# Patient Record
(demographics unavailable — no encounter records)

---

## 2019-03-21 NOTE — RADIOLOGY REPORT (SQ)
EXAM DESCRIPTION:  FOOT RIGHT COMPLETE



COMPLETED DATE/TIME:  3/21/2019 8:33 am



REASON FOR STUDY:  NON-PRS CHRONIC ULCER OTH PRT RIGHT FOOT W FAT LAYER EXPOSED L97.512  NON-PRS 
ARABELLA ULCER OTH PRT RIGHT FOOT W FAT LAYER



COMPARISON:  1/8/2019



NUMBER OF VIEWS:  Three views.



TECHNIQUE:  AP, lateral and oblique  radiographic images acquired of the right foot.



LIMITATIONS:  None.



FINDINGS:  MINERALIZATION: Normal.

BONES: Postsurgical changes from prior great toe phalanx amputation.  Unchanged heterotopic fragments
 about the distal 1st metatarsal.  There is increased heterotopic ossification and cortical lucency a
bout the mid and distal 3rd phalanges suggestive of osteomyelitis.  No evidence of acute fracture or 
dislocation.  Small plantar calcaneal enthesophytes.  Trigonal process.

JOINTS: Mild midfoot degenerative changes.  No large effusion.

SOFT TISSUES: Postsurgical changes from 1st digit amputation.  Soft tissue swelling about the distal 
3rd digit.  .  No radiopaque foreign body.  Scattered vascular calcifications.

OTHER: No other significant finding.



IMPRESSION:  Increased cortical destruction about the mid and distal 3rd phalanges most compatible wi
th osteomyelitis.

Postsurgical changes from prior great toe phalanx amputation, stable.



TECHNICAL DOCUMENTATION:  JOB ID:  [id number]

 2011 [employer]- All Rights Reserved



Reading location - IP/workstation name: [email or web address]

## 2019-04-17 NOTE — RADIOLOGY REPORT (SQ)
EXAM DESCRIPTION:  FOOT RIGHT COMPLETE



COMPLETED DATE/TIME:  4/17/2019 11:56 am



REASON FOR STUDY:  NON-PRS CHRONIC ULCER OTH PRT RIGHT FOOT W NECROSIS OF BONE L97.514  NON-PRS CHRON
IC ULCER OTH PRT RIGHT FOOT W NECROSIS



COMPARISON:  8/20 1/9



NUMBER OF VIEWS:  Three views.



TECHNIQUE:  AP, lateral and oblique  radiographic images acquired of the right foot.



LIMITATIONS:  None.



FINDINGS:  BONES: There is worsening bony destruction about the mid and distal 3rd phalanges.  Eviden
ce of prior 1st toe amputation, not significantly changed from prior.  Unchanged appearance of the di
stal 2nd tuft. No new fracture.  No evidence of dislocation.  Trigonal process.  Calcaneal enthesophy
laila.  Midfoot degenerative change with osteophytosis.

JOINTS: No effusions.

SOFT TISSUES: With soft tissue swelling about the distal 3rd digit.  Prior 1st digit amputation.

OTHER: No other significant finding.



IMPRESSION:  Worsening bony destruction about the mid and distal 3rd phalanges most compatible with o
steomyelitis.

Prior 1st toe amputation, unchanged.



TECHNICAL DOCUMENTATION:  JOB ID:  [id number]

 2011 [employer]- All Rights Reserved



Reading location - IP/workstation name: [email or web address]

## 2019-06-13 NOTE — RADIOLOGY REPORT (SQ)
EXAM DESCRIPTION:  FOOT LEFT COMPLETE



COMPLETED DATE/TIME:  6/13/2019 11:28 am



REASON FOR STUDY:  NON-PRS CHRONIC ULCER OTH PRT LEFT FOOT W FAT LAYER EXPOSED L97.522  NON-PRS CHRON
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



COMPARISON:  None.



NUMBER OF VIEWS:  Three views.



TECHNIQUE:  AP, lateral and oblique  radiographic images acquired of the left foot.



LIMITATIONS:  None.



FINDINGS:  Aggressive deemed mineralization of the distal phalanx left foot 3rd toe distal tuft, dist
al phalanx.  Overlying soft tissue swelling with radiopaque ointment in knee soft tissue ulcer.

Old prior great toe amputation at the piece of the 1st metatarsal.

Remainder of the left foot is otherwise unremarkable.



IMPRESSION:  Osteomyelitis, left foot 3rd toe distal tuft, distal phalanx.



TECHNICAL DOCUMENTATION:  JOB ID:  [id number]

 2011 [employer]- All Rights Reserved



Reading location - IP/workstation name: [email or web address]

## 2019-07-09 NOTE — RADIOLOGY REPORT (SQ)
EXAM DESCRIPTION:  FOOT LEFT COMPLETE



COMPLETED DATE/TIME:  7/9/2019 10:51 am



REASON FOR STUDY:  ULCER ON LT FOOT L97.522  NON-PRS CHRONIC ULCER OTH PRT LEFT FOOT W FAT LAYER



COMPARISON:  6/13/2019



NUMBER OF VIEWS:  Three views.



TECHNIQUE:  AP, lateral and oblique  radiographic images acquired of the left foot.



LIMITATIONS:  None.



FINDINGS:  MINERALIZATION: Normal.

BONES: Progression of the osteomyelitis of the distal 3rd digit.  progressive bone destruction.

Chronic postsurgical changes of the 1st ray.  Similar in appearance to previous.

Focal lytic lesion proximal seconds metatarsal stable.

JOINTS: No effusions.

SOFT TISSUES: Chronic calcification of the Achilles.

OTHER: No other significant finding.



IMPRESSION:  Progressive osteomyelitis of the 3rd digit.

Stable appearance of the changes in the 1st and seconds rays.



TECHNICAL DOCUMENTATION:  JOB ID:  [id number]

 2011 [employer]- All Rights Reserved



Reading location - IP/workstation name: [email or web address]

## 2019-09-24 NOTE — OPERATIVE REPORT
Operative Report-[facility]


Operative Report: 





DATE OF SURGERY: 9/24/2019


PREOPERATIVE DIAGNOSIS: CATARACT, LEFT EYE.


POSTOPERATIVE DIAGNOSIS: CATARACT, LEFT EYE.


PROCEDURE PERFORMED: PHACOEMULSIFICATION WITH POSTERIOR CHAMBER INTRAOCULAR 

LENS, LEFT EYE.


Intraocular Lens Model : MX60E 13.5


Total Phaco Time: 2.04 minutes


SURGEON: TANNER GOLDMAN MD


ANESTHESIA: TOPICAL WITH MAC.


INDICATIONS FOR SURGERY: Difficultly driving at night


PROCEDURE:


The patient was brought to the Operating Room and placed on the operative table.

Following tetracaine drops, topical anesthesia was administered. This consisted 

of instrument wipe pledgets soaked in a solution of 4% Xylocaine mixed with 

0.75% Marcaine in a 1:2 ratio. A 2 x 1 cm pledget was placed in the superior 

fornix. A 1 x 1 cm pledget was placed in the inferior fornix. The eye was 

patched shut for 5 minutes. The patch was removed. The eye was sterilely prepped

and draped in the usual manner. Lid speculum was placed in the eye. The pledgets

were removed. 4-0 black silk sutures were placed around the superior and the 

inferior rectus muscles to be used as traction. A conjunctival peritomy was made

at the 10 o'clock position. Hemostasis was obtained with bipolar cautery. A 

posterior limbal groove was created using a crescent knife and dissected 

anteriorly towards the cornea. A sharp point blade was used to create a 

paracentesis site at the 2 o'clock position. 0.2 cc non preserved Lidocaine was 

injected into the anterior chamber. A 2.4 mm keratome was used to enter the 

anterior chamber through the groove. Viscoelastic was injected into the anterior

chamber. An anterior capsulotomy was performed using Utrata forceps in a 

capsulorrhexis fashion. Hydrodissection and hydrodelineation were performed. 

Phacoemulsification was performed in divide-and-conquer technique.


Following this, the I/A unit was used to remove residual cortex. Viscoelastic 

was injected into the capsular bag. The Intraocular lens was placed in the 

capsular bag. The I/A unit was used to remove residual viscoelastic. The wound 

was seen to be watertight under high and low pressure, and no sutures were 

placed. The intraocular lens was well centered. The pressure was adjusted in the

eye to normal pressure. The 4-0 black silk sutures and lid speculum were 

removed. The eye was shielded after Besivance and Cosopt drops were placed. The 

patient tolerated the procedure well and was sent to the Recovery Room in good 

condition.

## 2019-11-19 NOTE — OPERATIVE REPORT
Operative Report-[facility]


Operative Report: 





DATE OF SURGERY: 11/19/2019


PREOPERATIVE DIAGNOSIS: CATARACT, RIGHT EYE.


POSTOPERATIVE DIAGNOSIS: CATARACT, RIGHT EYE.


PROCEDURE PERFORMED:


PHACOEMULSIFICATION WITH POSTERIOR CHAMBER INTRAOCULAR LENS, RIGHT EYE.


Intraocular Lens Model : MX60E 15.0


Total Phaco Time: 5.51 CDE


SURGEON: TANNER GOLDMAN MD


ANESTHESIA: TOPICAL WITH MAC.


INDICATIONS FOR SURGERY: Difficulty reading small print.


PROCEDURE:


The patient was brought to the Operating Room and placed on the operative table.

Following tetracaine drops, topical anesthesia was administered. This consisted 

of instrument wipe pledgets soaked in a solution of 4% Xylocaine mixed with 

0.75% Marcaine in a 1:2 ratio. A 2 x 1 cm pledget was placed in the superior 

fornix. A 1 x 1 cm pledget was placed in the inferior fornix. The eye was 

patched shut for 5 minutes. The patch was removed. The eye was sterilely prepped

and draped in the usual manner. Lid speculum was placed in the eye. The pledgets

were removed. 4-0 black silk sutures were placed around the superior and the 

inferior rectus muscles to be used as traction. A conjunctival peritomy was made

at the 10 o'clock position. Hemostasis was obtained with bipolar cautery. A 

posterior limbal groove was created using a crescent knife and dissected 

anteriorly towards the cornea. A sharp point blade was used to create a 

paracentesis site at the 2 o'clock position. 0.2 cc non preserved Lidocaine was 

injected into the anterior chamber. A 2.4 mm keratome was used to enter the 

anterior chamber through the groove. Viscoelastic was injected into the anterior

chamber. An anterior capsulotomy was performed using Utrata forceps in a 

capsulorrhexis fashion. Hydrodissection and hydrodelineation were performed. 

Phacoemulsification was performed in divide-and-conquer technique.


Following this, the I/A unit was used to remove residual cortex. Viscoelastic 

was injected into the capsular bag. The Intraocular lens was placed in the 

capsular bag. The I/A unit was used to remove residual viscoelastic. The wound 

was seen to be watertight under high and low pressure, and no sutures were 

placed. The intraocular lens was well centered. The pressure was adjusted in the

eye to normal pressure. The 4-0 black silk sutures and lid speculum were 

removed. The eye was shielded after Besivance and Cosopt drops were placed. The 

patient tolerated the procedure well and was sent to the Recovery Room in good 

condition.

## 2020-02-27 NOTE — RADIOLOGY REPORT (SQ)
EXAM DESCRIPTION:  FOOT LEFT COMPLETE



COMPLETED DATE/TIME:  2/27/2020 10:35 am



REASON FOR STUDY:  NON-PRS CHRONIC ULCER OTH PRT LEFT FOOT W FAT LAYER EXPOSED L97.522  NON-PRS CHRON
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



COMPARISON:  7/9/2019



NUMBER OF VIEWS:  Three views.



TECHNIQUE:  AP, lateral and oblique  radiographic images acquired of the left foot.



LIMITATIONS:  None.



FINDINGS:  MINERALIZATION: Normal.

BONES: No fracture or dislocation.  No evidence osteomyelitis.  Amputation of the 1st digit from the 
base of the 1st metatarsal.  Prominent dorsal calcaneal spur.

JOINTS: No effusions.

SOFT TISSUES: No soft tissue swelling.  No foreign body.

OTHER: A soft tissue calcifications associated with the Achilles tendon.



IMPRESSION:  There is no evidence of osteomyelitis.  Findings as described.



TECHNICAL DOCUMENTATION:  JOB ID:  [id number]

 2011 [employer]- All Rights Reserved



Reading location - IP/workstation name: [email or web address]

## 2020-03-26 NOTE — RADIOLOGY REPORT (SQ)
EXAM DESCRIPTION:  FOOT LEFT COMPLETE



COMPLETED DATE/TIME:  3/26/2020 10:18 am



REASON FOR STUDY:  NON-PRS CHRONIC ULCER OTH PRT LEFT FOOT W FAT LAYER EXPOSED L97.522  NON-PRS CHRON
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



COMPARISON:  2/27/2020



NUMBER OF VIEWS:  Three views.



TECHNIQUE:  AP, lateral and oblique  radiographic images acquired of the left foot.



LIMITATIONS:  None.



FINDINGS:  MINERALIZATION: Normal.

BONES: Postsurgical changes are again noted.  No acute fracture or dislocation.  No conventional radi
ographic evidence of osteomyelitis.

JOINTS: No effusions.

SOFT TISSUES: No soft tissue swelling.  No foreign body.

OTHER: No other significant finding.



IMPRESSION:  Postsurgical changes.  No conventional radiographic evidence of osteomyelitis.



TECHNICAL DOCUMENTATION:  JOB ID:  [id number]

 2011 [employer]- All Rights Reserved



Reading location - IP/workstation name: [email or web address]

## 2020-05-28 NOTE — RADIOLOGY REPORT (SQ)
EXAM DESCRIPTION:  FOOT LEFT COMPLETE



IMAGES COMPLETED DATE/TIME:  5/28/2020 11:06 am



REASON FOR STUDY:  NON-PRS CHRONIC ULCER OTH PRT LEFT FOOT W FAT LAYER EXPOSED L97.522  NON-PRS CHRON
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



COMPARISON:  Left foot films 7/9/2019, 2/27/2020, 3/26/2020



NUMBER OF VIEWS:  Three views.



TECHNIQUE:  AP, lateral and oblique  radiographic images acquired of the left foot.



LIMITATIONS:  None.



FINDINGS:  Radiopaque ointment is present along the dorsal left 2nd toe PIP joint from skin ulcer.  N
o underlying aggressive bony demineralization worrisome for osteomyelitis.

Over the series of exams, improved mineralization of the 3rd toe distal phalanx has occurred from hea
led osteomyelitis.

Stable resorption of the 4th toe distal phalanx unchanged over the series of exams.

Patient has a medial forefoot soft tissue ulcer.  Deep to the ulcer, calcifications/ossification is p
resent, stable.  Old 1st toe transmetatarsal amputation.

There is spotty calcification of the Achilles tendon from chronic tendinopathy.



IMPRESSION:  Ulcer over the 2nd toe PIP joint without aggressive bony demineralization worrisome for 
osteomyelitis



TECHNICAL DOCUMENTATION:  JOB ID:  [id number]

 2011 [employer]- All Rights Reserved



Reading location - IP/workstation name: [email or web address]